# Patient Record
Sex: FEMALE | HISPANIC OR LATINO | Employment: FULL TIME | URBAN - METROPOLITAN AREA
[De-identification: names, ages, dates, MRNs, and addresses within clinical notes are randomized per-mention and may not be internally consistent; named-entity substitution may affect disease eponyms.]

---

## 2018-02-08 ENCOUNTER — HOSPITAL ENCOUNTER (OUTPATIENT)
Dept: LAB | Facility: MEDICAL CENTER | Age: 58
End: 2018-02-08
Attending: EMERGENCY MEDICINE
Payer: COMMERCIAL

## 2018-02-08 ENCOUNTER — HOSPITAL ENCOUNTER (OUTPATIENT)
Facility: MEDICAL CENTER | Age: 58
End: 2018-02-08
Attending: EMERGENCY MEDICINE
Payer: COMMERCIAL

## 2018-02-08 ENCOUNTER — OFFICE VISIT (OUTPATIENT)
Dept: URGENT CARE | Facility: PHYSICIAN GROUP | Age: 58
End: 2018-02-08
Payer: COMMERCIAL

## 2018-02-08 VITALS
HEART RATE: 64 BPM | HEIGHT: 67 IN | DIASTOLIC BLOOD PRESSURE: 68 MMHG | RESPIRATION RATE: 14 BRPM | OXYGEN SATURATION: 96 % | WEIGHT: 129 LBS | SYSTOLIC BLOOD PRESSURE: 122 MMHG | BODY MASS INDEX: 20.25 KG/M2 | TEMPERATURE: 99 F

## 2018-02-08 DIAGNOSIS — J98.8 RTI (RESPIRATORY TRACT INFECTION): ICD-10-CM

## 2018-02-08 DIAGNOSIS — R11.2 NAUSEA VOMITING AND DIARRHEA: ICD-10-CM

## 2018-02-08 DIAGNOSIS — R19.7 NAUSEA VOMITING AND DIARRHEA: ICD-10-CM

## 2018-02-08 DIAGNOSIS — R73.9 HYPERGLYCEMIA: ICD-10-CM

## 2018-02-08 DIAGNOSIS — H10.33 ACUTE CONJUNCTIVITIS OF BOTH EYES, UNSPECIFIED ACUTE CONJUNCTIVITIS TYPE: ICD-10-CM

## 2018-02-08 LAB
ALBUMIN SERPL BCP-MCNC: 3.9 G/DL (ref 3.2–4.9)
ALBUMIN/GLOB SERPL: 1.3 G/DL
ALP SERPL-CCNC: 51 U/L (ref 30–99)
ALT SERPL-CCNC: 53 U/L (ref 2–50)
ANION GAP SERPL CALC-SCNC: 8 MMOL/L (ref 0–11.9)
ANISOCYTOSIS BLD QL SMEAR: ABNORMAL
APPEARANCE UR: NORMAL
AST SERPL-CCNC: 42 U/L (ref 12–45)
BASOPHILS # BLD AUTO: 0.9 % (ref 0–1.8)
BASOPHILS # BLD: 0.08 K/UL (ref 0–0.12)
BILIRUB SERPL-MCNC: 0.7 MG/DL (ref 0.1–1.5)
BILIRUB UR STRIP-MCNC: NORMAL MG/DL
BUN SERPL-MCNC: 12 MG/DL (ref 8–22)
CALCIUM SERPL-MCNC: 9.9 MG/DL (ref 8.5–10.5)
CHLORIDE SERPL-SCNC: 103 MMOL/L (ref 96–112)
CO2 SERPL-SCNC: 29 MMOL/L (ref 20–33)
COLOR UR AUTO: NORMAL
CREAT SERPL-MCNC: 0.59 MG/DL (ref 0.5–1.4)
EOSINOPHIL # BLD AUTO: 0.15 K/UL (ref 0–0.51)
EOSINOPHIL NFR BLD: 1.7 % (ref 0–6.9)
ERYTHROCYTE [DISTWIDTH] IN BLOOD BY AUTOMATED COUNT: 41.8 FL (ref 35.9–50)
FLUAV+FLUBV AG SPEC QL IA: NEGATIVE
GLOBULIN SER CALC-MCNC: 3 G/DL (ref 1.9–3.5)
GLUCOSE BLD-MCNC: 251 MG/DL (ref 70–100)
GLUCOSE SERPL-MCNC: 268 MG/DL (ref 65–99)
GLUCOSE UR STRIP.AUTO-MCNC: 500 MG/DL
HCT VFR BLD AUTO: 39.5 % (ref 37–47)
HGB BLD-MCNC: 13.2 G/DL (ref 12–16)
INT CON NEG: NEGATIVE
INT CON POS: POSITIVE
KETONES UR STRIP.AUTO-MCNC: NORMAL MG/DL
LEUKOCYTE ESTERASE UR QL STRIP.AUTO: NORMAL
LIPASE SERPL-CCNC: 29 U/L (ref 11–82)
LYMPHOCYTES # BLD AUTO: 3.52 K/UL (ref 1–4.8)
LYMPHOCYTES NFR BLD: 40.5 % (ref 22–41)
MACROCYTES BLD QL SMEAR: ABNORMAL
MANUAL DIFF BLD: NORMAL
MCH RBC QN AUTO: 31.6 PG (ref 27–33)
MCHC RBC AUTO-ENTMCNC: 33.4 G/DL (ref 33.6–35)
MCV RBC AUTO: 94.5 FL (ref 81.4–97.8)
MONOCYTES # BLD AUTO: 0.45 K/UL (ref 0–0.85)
MONOCYTES NFR BLD AUTO: 5.2 % (ref 0–13.4)
MORPHOLOGY BLD-IMP: NORMAL
NEUTROPHILS # BLD AUTO: 4.5 K/UL (ref 2–7.15)
NEUTROPHILS NFR BLD: 50 % (ref 44–72)
NEUTS BAND NFR BLD MANUAL: 1.7 % (ref 0–10)
NITRITE UR QL STRIP.AUTO: NORMAL
NRBC # BLD AUTO: 0 K/UL
NRBC BLD-RTO: 0 /100 WBC
PH UR STRIP.AUTO: 8 [PH] (ref 5–8)
PLATELET # BLD AUTO: 230 K/UL (ref 164–446)
PMV BLD AUTO: 9.4 FL (ref 9–12.9)
POTASSIUM SERPL-SCNC: 4.3 MMOL/L (ref 3.6–5.5)
PROT SERPL-MCNC: 6.9 G/DL (ref 6–8.2)
PROT UR QL STRIP: 30 MG/DL
RBC # BLD AUTO: 4.18 M/UL (ref 4.2–5.4)
RBC BLD AUTO: PRESENT
RBC UR QL AUTO: NORMAL
SODIUM SERPL-SCNC: 140 MMOL/L (ref 135–145)
SP GR UR STRIP.AUTO: 1
UROBILINOGEN UR STRIP-MCNC: 8 MG/DL
WBC # BLD AUTO: 8.7 K/UL (ref 4.8–10.8)

## 2018-02-08 PROCEDURE — 83690 ASSAY OF LIPASE: CPT

## 2018-02-08 PROCEDURE — G0480 DRUG TEST DEF 1-7 CLASSES: HCPCS

## 2018-02-08 PROCEDURE — 87086 URINE CULTURE/COLONY COUNT: CPT

## 2018-02-08 PROCEDURE — 80053 COMPREHEN METABOLIC PANEL: CPT

## 2018-02-08 PROCEDURE — 87804 INFLUENZA ASSAY W/OPTIC: CPT | Performed by: EMERGENCY MEDICINE

## 2018-02-08 PROCEDURE — 99204 OFFICE O/P NEW MOD 45 MIN: CPT | Performed by: EMERGENCY MEDICINE

## 2018-02-08 PROCEDURE — 82962 GLUCOSE BLOOD TEST: CPT | Performed by: EMERGENCY MEDICINE

## 2018-02-08 PROCEDURE — 81002 URINALYSIS NONAUTO W/O SCOPE: CPT | Performed by: EMERGENCY MEDICINE

## 2018-02-08 PROCEDURE — 36415 COLL VENOUS BLD VENIPUNCTURE: CPT

## 2018-02-08 PROCEDURE — 85007 BL SMEAR W/DIFF WBC COUNT: CPT

## 2018-02-08 PROCEDURE — 85027 COMPLETE CBC AUTOMATED: CPT

## 2018-02-08 ASSESSMENT — ENCOUNTER SYMPTOMS
VOMITING: 1
COUGH: 1
DIARRHEA: 1
EYE DISCHARGE: 1
SENSORY CHANGE: 0
BLURRED VISION: 0
NUMBER OF EPISODES OF EMESIS TODAY: 1
MYALGIAS: 1
PHOTOPHOBIA: 0
CHILLS: 1
EYE PAIN: 0
ABDOMINAL PAIN: 1
EYE REDNESS: 1
CHANGE IN BOWEL HABIT: 1
FEVER: 1
SHORTNESS OF BREATH: 0
BLOOD IN STOOL: 0
FLANK PAIN: 0
SPUTUM PRODUCTION: 0
HEADACHES: 1
RHINORRHEA: 1
FOCAL WEAKNESS: 0
ROS GI COMMENTS: NO HEMATEMESIS
NAUSEA: 1
LOSS OF CONSCIOUSNESS: 0
ANOREXIA: 0
SORE THROAT: 1
HEARTBURN: 0

## 2018-02-08 NOTE — PROGRESS NOTES
Subjective:      Funmi Kelly is a 57 y.o. female who presents with Conjunctivitis (since saturday, headache, diarrhea and vomiting x 1 day)            Conjunctivitis   This is a new problem. Episode onset: 4 days. The problem occurs daily. The problem has been gradually worsening. Associated symptoms include abdominal pain, a change in bowel habit, chills, congestion, coughing, a fever, headaches, myalgias, nausea, a sore throat, urinary symptoms and vomiting. Pertinent negatives include no anorexia, chest pain or rash. Nothing aggravates the symptoms. Treatments tried: OTC eye drops. The treatment provided mild relief.   URI    This is a recurrent problem. Episode onset: 3 days. The problem has been gradually improving. Maximum temperature: subjective. Associated symptoms include abdominal pain, congestion, coughing, diarrhea, headaches, nausea, rhinorrhea, a sore throat and vomiting. Pertinent negatives include no chest pain, dysuria, ear pain, plugged ear sensation or rash. She has tried increased fluids for the symptoms. The treatment provided no relief.   Emesis   This is a new problem. Episode onset: 4 days ago. The problem has been rapidly improving. Associated symptoms include abdominal pain, a change in bowel habit, chills, congestion, coughing, a fever, headaches, myalgias, nausea, a sore throat, urinary symptoms and vomiting. Pertinent negatives include no anorexia, chest pain or rash. The symptoms are aggravated by eating. She has tried eating and drinking for the symptoms. The treatment provided no relief.   Wears eyeglasses, no contact lenses. Notes allergic conjunctivitis in the past. No high-risk contacts, travel, recent antibiotic use. Notes preceding occasional RLQ pains last several weeks, not now.  Vomiting and diarrhea at onset; notes continued nausea.  Notes blood glucose checks in the past up to 160 non-fasting.    Review of Systems   Constitutional: Positive for chills and fever.  "  HENT: Positive for congestion, rhinorrhea and sore throat. Negative for ear discharge, ear pain and nosebleeds.    Eyes: Positive for discharge and redness. Negative for blurred vision, photophobia and pain.        Eye itching, crusting left > right   Respiratory: Positive for cough. Negative for sputum production and shortness of breath.    Cardiovascular: Negative for chest pain.   Gastrointestinal: Positive for abdominal pain, change in bowel habit, diarrhea, nausea and vomiting. Negative for anorexia, blood in stool and heartburn.        No hematemesis   Genitourinary: Positive for frequency. Negative for dysuria, flank pain, hematuria and urgency.        No vaginal discharge or bleeding.   Musculoskeletal: Positive for myalgias.   Skin: Negative for rash.   Neurological: Positive for headaches. Negative for sensory change, focal weakness and loss of consciousness.        Frontal   Endo/Heme/Allergies: Positive for environmental allergies.   All other systems reviewed and are negative.      PMH:  has no past medical history on file.  MEDS: No current outpatient prescriptions on file.  ALLERGIES:   Allergies   Allergen Reactions   • Advil [Ibuprofen] Swelling     facial   • Asa [Aspirin] Swelling     facial     SURGHX: No past surgical history on file.  SOCHX:    FH: family history is not on file.     Objective:     /68   Pulse 64   Temp 37.2 °C (99 °F)   Resp 14   Ht 1.702 m (5' 7\")   Wt 58.5 kg (129 lb)   SpO2 96%   BMI 20.20 kg/m²      Physical Exam   Constitutional: She is oriented to person, place, and time. She appears well-developed and well-nourished. She is cooperative.  Non-toxic appearance. She does not have a sickly appearance. No distress.   HENT:   Head: Normocephalic.   Right Ear: Tympanic membrane and ear canal normal.   Left Ear: Tympanic membrane and ear canal normal.   Nose: Mucosal edema present. No rhinorrhea.   Mouth/Throat: Uvula is midline and mucous membranes are normal. No " oropharyngeal exudate, posterior oropharyngeal edema or posterior oropharyngeal erythema.   Eyes: EOM and lids are normal. Pupils are equal, round, and reactive to light. Right eye exhibits no discharge and no hordeolum. Left eye exhibits no discharge and no hordeolum. Right conjunctiva is injected. Right conjunctiva has no hemorrhage. Left conjunctiva is injected. Left conjunctiva has no hemorrhage.   Neck: Trachea normal and phonation normal. Neck supple. No JVD present. No thyromegaly present.   Cardiovascular: Normal rate, regular rhythm and normal heart sounds.    No murmur heard.  No significant pedal edema.   Pulmonary/Chest: Effort normal. She has no decreased breath sounds. She has no wheezes. She has no rhonchi. She has no rales.   Abdominal: Soft. She exhibits no distension and no mass. Bowel sounds are decreased. There is generalized tenderness. There is no rigidity, no rebound, no guarding and no CVA tenderness.   Musculoskeletal:   No joint effusions   Lymphadenopathy:     She has no cervical adenopathy.        Right: No inguinal adenopathy present.        Left: No inguinal adenopathy present.   Neurological: She is alert and oriented to person, place, and time. She exhibits normal muscle tone. Coordination and gait normal.   Skin: Skin is warm and dry. No rash noted.   Psychiatric: She has a normal mood and affect. Her speech is normal and behavior is normal. Thought content normal.          S/S/E not consistent with acute narrow angle glaucoma; not acute abdomen.  Advised of need to go to the emergency department if any worsening pain, continued vomiting or diarrhea.   Assessment/Plan:     1. RTI (respiratory tract infection)  negative- POCT Influenza A/B  Recommended supportive care measures, including rest, increasing oral fluid intake.    2. Acute conjunctivitis of both eyes, unspecified acute conjunctivitis type  OTC ketotifen    3. Nausea vomiting and diarrhea  Positive urobili, protein,  glucose- POCT Urinalysis  251-POCT glucose  OTC H2 blocker  CBC unremarkable,   CMP glucose 268, ALT 53,   Lipase normal,   ketones, UA  Advised need for oral hydration    4. Hyperglycemia  PCP F/U arranged for 02/12/18.

## 2018-02-08 NOTE — PATIENT INSTRUCTIONS
Go to the nearest hospital Emergency Department, or call 911, if any worsening condition.  You may use over-the-counter ketotifen (Zaditor) as needed for eye itching relief.  Use over-the-counter famotidine (Pepcid AC), ranitidine (Zantac) or cimetidine (Tagamet) as needed for relief of symptoms; follow the package instructions for dosing.  Use over-the-counter acetaminophen (Tylenol) as needed for pain relief; follow the package instructions for dosing.  You should avoid use of non-steroidal anti- inflammatory medications, such as ibuprofen (Motrin, Advil), naproxen (Aleve), or aspirin-containing medications.  You should contact a primary care provider for follow-up as soon as available.

## 2018-02-09 ENCOUNTER — TELEPHONE (OUTPATIENT)
Dept: URGENT CARE | Facility: PHYSICIAN GROUP | Age: 58
End: 2018-02-09

## 2018-02-09 DIAGNOSIS — R11.2 NAUSEA VOMITING AND DIARRHEA: ICD-10-CM

## 2018-02-09 DIAGNOSIS — R19.7 NAUSEA VOMITING AND DIARRHEA: ICD-10-CM

## 2018-02-09 PROCEDURE — 87086 URINE CULTURE/COLONY COUNT: CPT

## 2018-02-10 LAB — ACETONE SERPL-MCNC: <5 MG/DL

## 2018-02-12 ENCOUNTER — OFFICE VISIT (OUTPATIENT)
Dept: MEDICAL GROUP | Facility: PHYSICIAN GROUP | Age: 58
End: 2018-02-12
Payer: COMMERCIAL

## 2018-02-12 VITALS
DIASTOLIC BLOOD PRESSURE: 78 MMHG | HEART RATE: 68 BPM | WEIGHT: 129.6 LBS | TEMPERATURE: 98.1 F | OXYGEN SATURATION: 96 % | SYSTOLIC BLOOD PRESSURE: 120 MMHG | BODY MASS INDEX: 20.3 KG/M2

## 2018-02-12 DIAGNOSIS — E11.9 TYPE 2 DIABETES MELLITUS WITHOUT COMPLICATION, WITHOUT LONG-TERM CURRENT USE OF INSULIN (HCC): ICD-10-CM

## 2018-02-12 LAB
BACTERIA UR CULT: NORMAL
HBA1C MFR BLD: 9.7 % (ref ?–5.8)
INT CON NEG: NORMAL
INT CON POS: NORMAL
SIGNIFICANT IND 70042: NORMAL
SITE SITE: NORMAL
SOURCE SOURCE: NORMAL

## 2018-02-12 PROCEDURE — 83036 HEMOGLOBIN GLYCOSYLATED A1C: CPT | Performed by: NURSE PRACTITIONER

## 2018-02-12 PROCEDURE — 99205 OFFICE O/P NEW HI 60 MIN: CPT | Performed by: NURSE PRACTITIONER

## 2018-02-12 RX ORDER — LANCETS 30 GAUGE
EACH MISCELLANEOUS
Qty: 100 EACH | Refills: 3 | Status: SHIPPED | OUTPATIENT
Start: 2018-02-12

## 2018-02-12 NOTE — PROGRESS NOTES
RN-CDE Note    Subjective:     Health changes since last visit/interval Hx: New onset diabetes, FSBG pxpvx=201 1.5 hours postprandial    Medications (including changes made today)  Current Outpatient Prescriptions   Medication Sig Dispense Refill   • vitamin D (CHOLECALCIFEROL) 1000 UNIT Tab Take 1,000 Units by mouth every day.       No current facility-administered medications for this visit.        Taking daily ASA: No  Taking above medications as prescribed: Yes  Patient Denies side effects of medication.    Exercise: moderate regular exercise, aerobic < 3 days a week  Diet: meals per day on average: 2, breakfast (eggs, toast, beans, juice) and lunch, cereal or milk with fruit as bedtime snack    Health Maintenance:   Health Maintenance Topics with due status: Overdue       Topic Date Due    IMM DTaP/Tdap/Td Vaccine 12/14/1979    PAP SMEAR 12/14/1981    MAMMOGRAM 12/14/2000    COLONOSCOPY 12/14/2010    IMM INFLUENZA 09/01/2017         DM:   Last A1c:   Lab Results   Component Value Date/Time    HBA1C 9.7 02/12/2018 03:46 PM      A1c goal: < 7    Glucose monitoring frequency: none     Last Retinal Exam: within past year Provider: [facility]  Daily Foot Exam: no  Routine Dental Exams: yes    No results found for: MICROALBCALC, MALBCRT, MALBEXCR, [id number], MICROALBUR, MICRALB, UMICROALBUM, MICROALBTIM     ACR Albumin/Creatinine Ratio goal <30 Due    Diabetic complications: none    HTN:   Blood pressure goal <140/<80 yes.   Currently Rx ACE/ARB: No    Dyslipidemia:    Lab Results   Component Value Date/Time    CHOLSTRLTOT 198 05/26/2016 11:12 AM     (H) 05/26/2016 11:12 AM    HDL 67 05/26/2016 11:12 AM    TRIGLYCERIDE 70 05/26/2016 11:12 AM       Lab Results   Component Value Date/Time    SODIUM 140 02/08/2018 04:02 PM    POTASSIUM 4.3 02/08/2018 04:02 PM    CHLORIDE 103 02/08/2018 04:02 PM    CO2 29 02/08/2018 04:02 PM    GLUCOSE 268 (H) 02/08/2018 04:02 PM    BUN 12 02/08/2018 04:02 PM     CREATININE 0.59 02/08/2018 04:02 PM     Lab Results   Component Value Date/Time    ALKPHOSPHAT 51 02/08/2018 04:02 PM    ASTSGOT 42 02/08/2018 04:02 PM    ALTSGPT 53 (H) 02/08/2018 04:02 PM    TBILIRUBIN 0.7 02/08/2018 04:02 PM        Currently Rx Statin: No      She  reports that she has been smoking.  She has never used smokeless tobacco.    Objective:     Exam:  Monofilament: done  Monofilament testing with a 10 gram force: sensation intact: intact bilaterally  Visual Inspection: Feet without maceration, ulcers, fissures.  Pedal pulses: intact bilaterally      Plan:     Discussed All medications, side effects and compliance (discussed carefully)  Annual eye examinations at Ophthalmology  Diabetic diet discussed in detail, written exchange diet given  Foot care discussed and Podiatry visits  Glucose meter dispensed to patient  Glycohemoglobin and other lab monitoring  Home glucose monitoring demonstrated and taught  Home glucose monitoring emphasized  Labs immediately prior to next visit  Referral to Diabetic Education Department. Type II DM class  Patient educated on Interpretation of lab results, Blood Sugar Goals, Exercise, SMBG Skills, Nutrition  Plate method  Recommended medication changes: start metformin 500 mg twice daily, stop drinking juice, change bedtime cereal to a protein snack

## 2018-02-13 NOTE — PROGRESS NOTES
Chief Complaint   Patient presents with   • Diabetes       HISTORY OF PRESENT ILLNESS: Patient is a 57 y.o. female patient who presents today to discuss the following issues:    Type 2 diabetes mellitus without complication, without long-term current use of insulin (CMS-HCC)  Patient is here today with newly diagnosed diabetes. She has an extensive family history of diabetes, but is having a hard time believing that she is diabetic.  We discussed diet and lifestyle choices at length.  She spent 30 minutes with our diabetes nurse educator.  She will start metformin, she will start checking her blood sugars, she will attend classes regarding diabetes, and she will have her labs checked in 3 months.  She will follow-up her in 3 months.      Patient Active Problem List    Diagnosis Date Noted   • Type 2 diabetes mellitus without complication, without long-term current use of insulin (CMS-HCC) 02/12/2018       Allergies:Advil [ibuprofen] and Asa [aspirin]    Current Outpatient Prescriptions   Medication Sig Dispense Refill   • vitamin D (CHOLECALCIFEROL) 1000 UNIT Tab Take 1,000 Units by mouth every day.     • metFORMIN (GLUCOPHAGE) 500 MG Tab Take 1 Tab by mouth 2 times a day, with meals. 60 Tab 11   • Blood Glucose Monitoring Suppl Supplies Misc Test strips order: [employer] Smartview Sig: use 1x/day and prn ssx high or low sugar #100 RF x 3 100 Strip 3   • Lancets Misc Lancets order: [employer] Fastclix Sig: use 1x/day and prn ssx high or low sugar. #100 RF x 3 100 Each 3     No current facility-administered medications for this visit.        Social History   Substance Use Topics   • Smoking status: Current Every Day Smoker   • Smokeless tobacco: Never Used   • Alcohol use Not on file       No family status information on file.   No family history on file.    Review of Systems:   Constitutional: Negative for fever, chills, weight loss and malaise/fatigue.   HENT: Negative for ear pain, nosebleeds, congestion, sore throat  and neck pain.    Eyes: Negative for blurred vision.   Respiratory: Negative for cough, sputum production, shortness of breath and wheezing.    Cardiovascular: Negative for chest pain, palpitations, orthopnea and leg swelling.   Gastrointestinal: Negative for heartburn, nausea, vomiting and abdominal pain.   Genitourinary: Negative for dysuria, urgency and frequency.   Musculoskeletal: Negative for myalgias, joint pain, and back pain.  Skin: Negative for rash and itching.   Neurological: Negative for dizziness, tingling, tremors, sensory change, focal weakness and headaches.   Endo/Heme/Allergies: Does not bruise/bleed easily.   Psychiatric/Behavioral: Negative for depression, suicidal ideas and memory loss.  The patient is not nervous/anxious and does not have insomnia.    All other systems reviewed and are negative except as in HPI.    Exam:  Blood pressure 120/78, pulse 68, temperature 36.7 °C (98.1 °F), weight 58.8 kg (129 lb 9.6 oz), SpO2 96 %.  General:  Well nourished, well developed female in NAD  Head: Grossly normal.  Neck: Supple without JVD or bruit. Thyroid is not enlarged.  Pulmonary: Clear to ausculation. Normal effort. No rales, ronchi, or wheezing.  Cardiovascular: Regular rate and rhythm without murmur.   Abdomen:  Soft, nontender, nondistended.  Extremities: No clubbing, cyanosis, or edema.  Skin: Intact with no obvious rashes or lesions.  Neuro: Grossly intact.  Psych: Alert and oriented x 3.  Mood and affect appropriate.    Medical decision-making and discussion: Funmi is here today to discuss a new diagnosis of diabetes.  Metformin was sent to her pharmacy, lab work was ordered, she was referred to classes, and she will follow-up here in 3 months.    Time: Greater than 50% of this 60 minute appointment was spent face-to-face providing counseling, coordination of care, etc.  She spent 40 minutes with the diabetes nurse educator, and 20 minutes face-to-face with  me.            Assessment/Plan:  1. Type 2 diabetes mellitus without complication, without long-term current use of insulin (CMS-[facility])  POCT Hemoglobin A1C    Diabetic Monofilament LE Exam    MICROALBUMIN CREAT RATIO URINE    LIPID PROFILE    REFERRAL TO DIABETIC EDUCATION Diabetes Self Management Education / Training (DSME/T) and Medical Nutrition Therapy (MNT): Initial Group DSME/MNT as authorized by payor, Follow-Up DSME/MNT as authorized by payor; DSME/T Content: Monitoring Diabete...    metFORMIN (GLUCOPHAGE) 500 MG Tab    Blood Glucose Monitoring Suppl Supplies Misc    Lancets Misc       Return in about 3 months (around 5/12/2018), or if symptoms worsen or fail to improve.    Please note that this dictation was created using voice recognition software. I have made every reasonable attempt to correct obvious errors, but I expect that there are errors of grammar and possibly content that I did not discover before finalizing the note.

## 2018-02-13 NOTE — ASSESSMENT & PLAN NOTE
Patient is here today with newly diagnosed diabetes. She has an extensive family history of diabetes, but is having a hard time believing that she is diabetic.  We discussed diet and lifestyle choices at length.  She spent 30 minutes with our diabetes nurse educator.  She will start metformin, she will start checking her blood sugars, she will attend classes regarding diabetes, and she will have her labs checked in 3 months.  She will follow-up her in 3 months.

## 2018-02-15 ENCOUNTER — HOSPITAL ENCOUNTER (OUTPATIENT)
Dept: LAB | Facility: MEDICAL CENTER | Age: 58
End: 2018-02-15
Attending: NURSE PRACTITIONER
Payer: COMMERCIAL

## 2018-02-15 DIAGNOSIS — E11.9 TYPE 2 DIABETES MELLITUS WITHOUT COMPLICATION, WITHOUT LONG-TERM CURRENT USE OF INSULIN (HCC): ICD-10-CM

## 2018-02-15 LAB
CHOLEST SERPL-MCNC: 166 MG/DL (ref 100–199)
CREAT UR-MCNC: 84.2 MG/DL
HDLC SERPL-MCNC: 51 MG/DL
LDLC SERPL CALC-MCNC: 100 MG/DL
MICROALBUMIN UR-MCNC: <0.7 MG/DL
MICROALBUMIN/CREAT UR: NORMAL MG/G (ref 0–30)
TRIGL SERPL-MCNC: 75 MG/DL (ref 0–149)

## 2018-02-15 PROCEDURE — 82043 UR ALBUMIN QUANTITATIVE: CPT

## 2018-02-15 PROCEDURE — 36415 COLL VENOUS BLD VENIPUNCTURE: CPT

## 2018-02-15 PROCEDURE — 80061 LIPID PANEL: CPT

## 2018-02-15 PROCEDURE — 82570 ASSAY OF URINE CREATININE: CPT

## 2018-02-22 ENCOUNTER — TELEPHONE (OUTPATIENT)
Dept: MEDICAL GROUP | Facility: PHYSICIAN GROUP | Age: 58
End: 2018-02-22

## 2018-02-23 NOTE — TELEPHONE ENCOUNTER
----- Message from TINA Parks sent at 2/22/2018  4:02 PM PST -----  Please call patient and let her know that her labs look good.  She should plan to keep her appointment to establish care with Dr. Herzog on May 7th.    Thank you!  Marcellus

## 2019-03-07 ENCOUNTER — NON-PROVIDER VISIT (OUTPATIENT)
Dept: URGENT CARE | Facility: PHYSICIAN GROUP | Age: 59
End: 2019-03-07

## 2019-03-07 DIAGNOSIS — Z02.1 PRE-EMPLOYMENT DRUG SCREENING: Primary | ICD-10-CM

## 2019-03-07 LAB
AMP AMPHETAMINE: NORMAL
COC COCAINE: NORMAL
INT CON NEG: NORMAL
INT CON POS: NORMAL
MET METHAMPHETAMINES: NORMAL
OPI OPIATES: NORMAL
PCP PHENCYCLIDINE: NORMAL
POC DRUG COMMENT 753798-OCCUPATIONAL HEALTH: NEGATIVE
THC: NORMAL

## 2019-03-07 PROCEDURE — 80305 DRUG TEST PRSMV DIR OPT OBS: CPT | Performed by: PHYSICIAN ASSISTANT

## 2019-10-07 ENCOUNTER — OFFICE VISIT (OUTPATIENT)
Dept: URGENT CARE | Facility: CLINIC | Age: 59
End: 2019-10-07

## 2019-10-07 VITALS
HEART RATE: 66 BPM | TEMPERATURE: 97.6 F | SYSTOLIC BLOOD PRESSURE: 102 MMHG | HEIGHT: 67 IN | RESPIRATION RATE: 16 BRPM | WEIGHT: 110 LBS | OXYGEN SATURATION: 96 % | BODY MASS INDEX: 17.27 KG/M2 | DIASTOLIC BLOOD PRESSURE: 74 MMHG

## 2019-10-07 DIAGNOSIS — J02.9 SORE THROAT: ICD-10-CM

## 2019-10-07 DIAGNOSIS — R59.0 CERVICAL LYMPHADENOPATHY: ICD-10-CM

## 2019-10-07 PROCEDURE — 99214 OFFICE O/P EST MOD 30 MIN: CPT | Performed by: PHYSICIAN ASSISTANT

## 2019-10-07 RX ORDER — AMOXICILLIN AND CLAVULANATE POTASSIUM 875; 125 MG/1; MG/1
1 TABLET, FILM COATED ORAL 2 TIMES DAILY
Qty: 20 TAB | Refills: 0 | Status: SHIPPED
Start: 2019-10-07 | End: 2020-02-05

## 2019-10-07 NOTE — PROGRESS NOTES
Chief Complaint   Patient presents with   • Sore Throat     x3 days swollen lymph nodes, sore throat, earaches, sinus pressure and congestion, fever chills, bodyaches, cough       HISTORY OF PRESENT ILLNESS: Patient is a 58 y.o. female who presents today for 3 days of worsened sore throat, swollen glands (particularly the left side of neck) as well as some body aches and slight coughing.  Notes the coughing is not severe. She has not had confirmed fevers but has felt hot on and off.  She states she woke up today and had increased suspected gland swelling under her left chin.  She denies oral pain or tooth pain.  No difficulty breathing or moving neck.   Ibuprofen not really helping.          Patient Active Problem List    Diagnosis Date Noted   • Type 2 diabetes mellitus without complication, without long-term current use of insulin ([facility]) 02/12/2018       Allergies:Advil [ibuprofen] and Asa [aspirin]    Current Outpatient Medications Ordered in Epic   Medication Sig Dispense Refill   • amoxicillin-clavulanate (AUGMENTIN) 875-125 MG Tab Take 1 Tab by mouth 2 times a day. 20 Tab 0   • vitamin D (CHOLECALCIFEROL) 1000 UNIT Tab Take 1,000 Units by mouth every day.     • metFORMIN (GLUCOPHAGE) 500 MG Tab Take 1 Tab by mouth 2 times a day, with meals. 60 Tab 11   • Blood Glucose Monitoring Suppl Supplies Misc Test strips order: [employer] Smartview Sig: use 1x/day and prn ssx high or low sugar #100 RF x 3 100 Strip 3   • Lancets Misc Lancets order: [employer] Fastclix Sig: use 1x/day and prn ssx high or low sugar. #100 RF x 3 100 Each 3     No current Epic-ordered facility-administered medications on file.        History reviewed. No pertinent past medical history.    Social History     Tobacco Use   • Smoking status: Current Every Day Smoker   • Smokeless tobacco: Never Used   Substance Use Topics   • Alcohol use: Not Currently   • Drug use: Never       No family status information on file.   History reviewed. No pertinent  "family history.    ROS:  Review of Systems   Constitutional: SEE HPI  HENT: SEE HPI.    Eyes: Negative for blurred vision.   Respiratory: Slight coughing without sputum production, shortness of breath and wheezing.    Cardiovascular: Negative for chest pain, palpitations, orthopnea and leg swelling.   Gastrointestinal: Negative for heartburn, nausea, vomiting and abdominal pain.       Exam:  /74   Pulse 66   Temp 36.4 °C (97.6 °F) (Temporal)   Resp 16   Ht 1.702 m (5' 7\")   Wt 49.9 kg (110 lb)   SpO2 96%   General:  Well nourished, well developed female in NAD  Eyes: PERRLA, EOM within normal limits, no conjunctival injection, no scleral icterus, visual fields and acuity grossly intact.  Ears: Normal shape and symmetry, no tenderness, no discharge. External canals are without any significant edema or erythema. Tympanic membranes are without any inflammation, no effusion. Gross auditory acuity is intact  Nose: Symmetrical, sinuses without tenderness, no discharge.   Mouth: reasonable hygiene, moderate posterior erythema without overt exudates or tonsillar enlargement.  Neck: range of motion within normal limits, no tracheal deviation.  Tender left submandibular and anterior cervical lymphadenopathy.   Pulmonary: Normal respiratory effort, no wheezes, crackles, or rhonchi.  Cardiovascular: regular rate and rhythm without murmurs, rubs, or gallops.  Skin: No visible rashes or lesion. Warm, pink, dry.   Extremities: no clubbing, cyanosis, or edema.  Neuro: A&O x 3. Speech normal/clear.  Normal gait.         Assessment/Plan:  1. Cervical lymphadenopathy  amoxicillin-clavulanate (AUGMENTIN) 875-125 MG Tab   2. Sore throat            -strep negative.    -sore throat with acute lymphadenopathy and without overt other URI symptoms.  Will tx and cover as above with strict RTC precautions/ER precautions for worsening swelling or symptoms despite tx.        Supportive care, differential diagnoses, and indications " for immediate follow-up discussed with patient.   Pathogenesis of diagnosis discussed including typical length and natural progression.   Instructed to return to clinic or nearest emergency department for any change in condition, further concerns, or worsening of symptoms.  Patient states understanding of the plan of care and discharge instructions.        Sary Grant P.A.-C.

## 2020-02-05 ENCOUNTER — OFFICE VISIT (OUTPATIENT)
Dept: URGENT CARE | Facility: CLINIC | Age: 60
End: 2020-02-05

## 2020-02-05 VITALS
DIASTOLIC BLOOD PRESSURE: 78 MMHG | OXYGEN SATURATION: 100 % | SYSTOLIC BLOOD PRESSURE: 110 MMHG | WEIGHT: 118 LBS | HEIGHT: 67 IN | BODY MASS INDEX: 18.52 KG/M2 | HEART RATE: 60 BPM | TEMPERATURE: 98.3 F | RESPIRATION RATE: 18 BRPM

## 2020-02-05 DIAGNOSIS — J01.40 ACUTE NON-RECURRENT PANSINUSITIS: ICD-10-CM

## 2020-02-05 DIAGNOSIS — H65.191 ACUTE MIDDLE EAR EFFUSION, RIGHT: ICD-10-CM

## 2020-02-05 PROCEDURE — 99214 OFFICE O/P EST MOD 30 MIN: CPT | Performed by: PHYSICIAN ASSISTANT

## 2020-02-05 RX ORDER — AMOXICILLIN AND CLAVULANATE POTASSIUM 875; 125 MG/1; MG/1
1 TABLET, FILM COATED ORAL 2 TIMES DAILY
Qty: 14 TAB | Refills: 0 | Status: SHIPPED | OUTPATIENT
Start: 2020-02-05 | End: 2020-02-12

## 2020-02-05 ASSESSMENT — ENCOUNTER SYMPTOMS
VOMITING: 0
CHILLS: 1
WHEEZING: 0
COUGH: 1
SHORTNESS OF BREATH: 0
ABDOMINAL PAIN: 0
NAUSEA: 1
RHINORRHEA: 1
HEADACHES: 1
SPUTUM PRODUCTION: 1
EYE DISCHARGE: 1
DIARRHEA: 0
FEVER: 1
SORE THROAT: 1

## 2020-02-05 NOTE — LETTER
[facility] [place] [id number] [facility] [id number]     February 5, 2020    Patient: Funmi Kelly   YOB: 1960   Date of Visit: 2/5/2020       To Whom It May Concern:    Funmi Kelly was seen and treated in our department on 2/5/2020 for sinus infection.     Sincerely,     Tanvir Jay P.A.-C.

## 2020-02-05 NOTE — PROGRESS NOTES
"Subjective:      Funmi Klely is a 59 y.o. female who presents with Allergic Rhinitis (eye drainage,headache-x5 days) and Earache (right hurts,left itchy)          Otalgia    There is pain in the right ear. This is a new problem. The current episode started in the past 7 days. The problem occurs constantly. The problem has been gradually worsening. There has been no fever. The pain is moderate. Associated symptoms include coughing, ear discharge, headaches, rhinorrhea and a sore throat. Pertinent negatives include no abdominal pain, diarrhea or vomiting. She has tried NSAIDs for the symptoms. The treatment provided no relief.       Review of Systems   Constitutional: Positive for chills and fever.   HENT: Positive for ear discharge, ear pain, rhinorrhea and sore throat.    Eyes: Positive for discharge.   Respiratory: Positive for cough and sputum production. Negative for shortness of breath and wheezing.    Cardiovascular: Negative for chest pain.   Gastrointestinal: Positive for nausea. Negative for abdominal pain, diarrhea and vomiting.   Skin: Negative.    Neurological: Positive for headaches.   Endo/Heme/Allergies: Positive for environmental allergies.          Objective:     /78 (BP Location: Left arm)   Pulse 60   Temp 36.8 °C (98.3 °F) (Temporal)   Resp 18   Ht 1.702 m (5' 7\")   Wt 53.5 kg (118 lb)   SpO2 100%   BMI 18.48 kg/m²      Physical Exam  Vitals signs reviewed.   Constitutional:       Appearance: Normal appearance.   HENT:      Right Ear: A middle ear effusion is present. Tympanic membrane is not injected or erythematous.      Left Ear: Tympanic membrane normal.      Nose: Mucosal edema and rhinorrhea present. Rhinorrhea is clear.      Right Sinus: Maxillary sinus tenderness and frontal sinus tenderness present.      Left Sinus: Maxillary sinus tenderness and frontal sinus tenderness present.      Mouth/Throat:      Mouth: Mucous membranes are moist.      Pharynx: Oropharynx is " clear. Uvula midline. No pharyngeal swelling, oropharyngeal exudate, posterior oropharyngeal erythema or uvula swelling.      Tonsils: No tonsillar exudate.   Eyes:      Conjunctiva/sclera: Conjunctivae normal.      Pupils: Pupils are equal, round, and reactive to light.   Cardiovascular:      Rate and Rhythm: Normal rate and regular rhythm.      Heart sounds: Normal heart sounds.   Pulmonary:      Effort: Pulmonary effort is normal. No respiratory distress.      Breath sounds: Normal breath sounds. No wheezing, rhonchi or rales.   Skin:     General: Skin is warm and dry.   Neurological:      General: No focal deficit present.      Mental Status: She is alert and oriented to person, place, and time.   Psychiatric:         Behavior: Behavior normal.       History reviewed. No pertinent past medical history. History reviewed. No pertinent surgical history.   Social History     Socioeconomic History   • Marital status: Single     Spouse name: Not on file   • Number of children: Not on file   • Years of education: Not on file   • Highest education level: Not on file   Occupational History   • Not on file   Social Needs   • Financial resource strain: Not on file   • Food insecurity:     Worry: Not on file     Inability: Not on file   • Transportation needs:     Medical: Not on file     Non-medical: Not on file   Tobacco Use   • Smoking status: Current Every Day Smoker   • Smokeless tobacco: Never Used   Substance and Sexual Activity   • Alcohol use: Not Currently   • Drug use: Never   • Sexual activity: Not on file   Lifestyle   • Physical activity:     Days per week: Not on file     Minutes per session: Not on file   • Stress: Not on file   Relationships   • Social connections:     Talks on phone: Not on file     Gets together: Not on file     Attends [employer] service: Not on file     Active member of club or organization: Not on file     Attends meetings of clubs or organizations: Not on file     Relationship status:  Not on file   • Intimate partner violence:     Fear of current or ex partner: Not on file     Emotionally abused: Not on file     Physically abused: Not on file     Forced sexual activity: Not on file   Other Topics Concern   • Not on file   Social History Narrative   • Not on file    Advil [ibuprofen] and Asa [aspirin]          Assessment/Plan:   1. Acute non-recurrent pansinusitis    - amoxicillin-clavulanate (AUGMENTIN) 875-125 MG Tab; Take 1 Tab by mouth 2 times a day for 7 days.  Dispense: 14 Tab; Refill: 0    2. Acute middle ear effusion, right    Discussed symptoms consistent with pansinusitis.  Due to examination findings, will treat with Augmentin for 7 days.  Also wrote down other instructions of Flonase, Claritin or Zyrtec in the morning time, Tylenol for any pain or fevers, plenty of fluids, nasal saline washes, Mucinex.    Return if symptoms are not improving the next 2 to 3 days, or sooner if any high fever, severe headache, hearing issues or any other concerns.    Supportive care, differential diagnoses, and indications for immediate follow-up discussed with patient.    Pathogenesis of diagnosis discussed including typical length and natural progression. Patient expresses understanding and agrees to plan.    Please note that this dictation was created using voice recognition software. I have made every reasonable attempt to correct obvious errors, but I expect that there are errors of grammar and possibly content that I did not discover before finalizing the note.

## 2021-03-15 DIAGNOSIS — Z23 NEED FOR VACCINATION: ICD-10-CM

## 2021-11-29 ENCOUNTER — HOSPITAL ENCOUNTER (OUTPATIENT)
Facility: MEDICAL CENTER | Age: 61
End: 2021-11-29
Attending: PHYSICIAN ASSISTANT
Payer: MEDICAID

## 2021-11-29 ENCOUNTER — OFFICE VISIT (OUTPATIENT)
Dept: URGENT CARE | Facility: CLINIC | Age: 61
End: 2021-11-29
Payer: MEDICAID

## 2021-11-29 VITALS
RESPIRATION RATE: 18 BRPM | DIASTOLIC BLOOD PRESSURE: 84 MMHG | TEMPERATURE: 98.4 F | BODY MASS INDEX: 19.62 KG/M2 | OXYGEN SATURATION: 95 % | SYSTOLIC BLOOD PRESSURE: 122 MMHG | WEIGHT: 125 LBS | HEART RATE: 69 BPM | HEIGHT: 67 IN

## 2021-11-29 DIAGNOSIS — J02.9 PHARYNGITIS, UNSPECIFIED ETIOLOGY: ICD-10-CM

## 2021-11-29 DIAGNOSIS — J06.9 VIRAL URI WITH COUGH: ICD-10-CM

## 2021-11-29 LAB — COVID ORDER STATUS COVID19: NORMAL

## 2021-11-29 PROCEDURE — U0003 INFECTIOUS AGENT DETECTION BY NUCLEIC ACID (DNA OR RNA); SEVERE ACUTE RESPIRATORY SYNDROME CORONAVIRUS 2 (SARS-COV-2) (CORONAVIRUS DISEASE [COVID-19]), AMPLIFIED PROBE TECHNIQUE, MAKING USE OF HIGH THROUGHPUT TECHNOLOGIES AS DESCRIBED BY CMS-2020-01-R: HCPCS

## 2021-11-29 PROCEDURE — U0005 INFEC AGEN DETEC AMPLI PROBE: HCPCS

## 2021-11-29 PROCEDURE — 99213 OFFICE O/P EST LOW 20 MIN: CPT | Performed by: PHYSICIAN ASSISTANT

## 2021-11-29 RX ORDER — DEXTROMETHORPHAN HBR. AND GUAIFENESIN 10; 100 MG/5ML; MG/5ML
10 SOLUTION ORAL EVERY 4 HOURS PRN
COMMUNITY

## 2021-11-29 ASSESSMENT — ENCOUNTER SYMPTOMS
COUGH: 1
CHILLS: 0
MYALGIAS: 1
VOMITING: 0
FEVER: 0
NAUSEA: 0
SPUTUM PRODUCTION: 1
SINUS PAIN: 0
SORE THROAT: 1
WHEEZING: 0
SHORTNESS OF BREATH: 0
DIARRHEA: 0
ABDOMINAL PAIN: 0
HEADACHES: 1

## 2021-11-29 NOTE — PROGRESS NOTES
"Subjective:   Funmi Kelly is a 60 y.o. female who presents for Nasal Congestion (nasal/chest congestion x 4 days with cough)      HPI  60 y.o. female presents to urgent care with new problem to provider of nasal congestion, cough, sore throat, and headache onset about 4 days ago.  Patient is vaccinated for COVID-19, however denies specific exposure or sick contacts.  She denies fevers, shortness of breath, chest pain, nausea, vomiting, or diarrhea.  Taking OTC cold and cough medications with some relief. Denies other associated aggravating or alleviating factors.     Review of Systems   Constitutional: Negative for chills and fever.   HENT: Positive for congestion and sore throat. Negative for ear pain and sinus pain.    Respiratory: Positive for cough and sputum production. Negative for shortness of breath and wheezing.    Cardiovascular: Negative for chest pain.   Gastrointestinal: Negative for abdominal pain, diarrhea, nausea and vomiting.   Musculoskeletal: Positive for myalgias.   Neurological: Positive for headaches.       Patient Active Problem List   Diagnosis   • Type 2 diabetes mellitus without complication, without long-term current use of insulin (HCC)     History reviewed. No pertinent surgical history.  Social History     Tobacco Use   • Smoking status: Former Smoker     Types: Cigarettes   • Smokeless tobacco: Never Used   Vaping Use   • Vaping Use: Never used   Substance Use Topics   • Alcohol use: Not Currently   • Drug use: Never      History reviewed. No pertinent family history.   (Allergies, Medications, & Tobacco/Substance Use were reconciled by the Medical Assistant and reviewed by myself. The family history is prepopulated)     Objective:     /84 (BP Location: Left arm, Patient Position: Sitting, BP Cuff Size: Adult long)   Pulse 69   Temp 36.9 °C (98.4 °F) (Temporal)   Resp 18   Ht 1.702 m (5' 7\")   Wt 56.7 kg (125 lb)   SpO2 95%   BMI 19.58 kg/m²     Physical " Exam  Vitals reviewed.   Constitutional:       General: She is not in acute distress.     Appearance: Normal appearance. She is not ill-appearing or diaphoretic.   HENT:      Head: Normocephalic and atraumatic.      Nose: Nose normal.      Mouth/Throat:      Mouth: Mucous membranes are moist.      Pharynx: Posterior oropharyngeal erythema present. No oropharyngeal exudate.   Eyes:      Conjunctiva/sclera: Conjunctivae normal.   Cardiovascular:      Rate and Rhythm: Normal rate and regular rhythm.      Heart sounds: Normal heart sounds. No murmur heard.  No friction rub. No gallop.    Pulmonary:      Effort: Pulmonary effort is normal. No respiratory distress.      Breath sounds: Normal breath sounds. No wheezing, rhonchi or rales.   Musculoskeletal:         General: Normal range of motion.      Cervical back: Normal range of motion and neck supple.   Lymphadenopathy:      Cervical: Cervical adenopathy present.   Skin:     General: Skin is warm and dry.      Findings: No rash.   Neurological:      General: No focal deficit present.      Mental Status: She is alert and oriented to person, place, and time.   Psychiatric:         Mood and Affect: Mood normal.         Behavior: Behavior normal.         Thought Content: Thought content normal.         Judgment: Judgment normal.         Assessment/Plan:     1. Pharyngitis, unspecified etiology  POCT Rapid Strep A   2. Viral URI with cough  COVID/SARS CoV-2 PCR     Point of care strep is negative in clinic today.    Patient instructed to self-isolate/quarantine per CDC guidelines.  I will follow-up pending COVID-19 testing. Discussed with patient may obtain hard copy of results on [employer].   Advised patient symptoms are most likely viral in etiology. Increased fluids and rest. Discussed use of OTC cough and cold medication and Tylenol/Motrin for symptomatic relief.  Return for reevaluation or proceed to ED if symptoms persist or worsen. Supportive care, differential  diagnoses, and indications for immediate follow-up discussed with patient. Patient should to proceed to ED for development of symptoms including but not limited to shortness of breath breath, difficulty breathing, or worsening symptoms not manageable at home.   Vital signs stable, patient in no acute respiratory distress.  COVID-19 discharge instructions and CDC guidelines provided to patient in AVS.      Differential diagnosis, natural history, supportive care, and indications for immediate follow-up discussed.    Advised the patient to follow-up with the primary care physician for recheck, reevaluation, and consideration of further management.  Patient verbalized understanding of treatment plan and has no further questions regarding care.   This patient is evaluated under [facility] isolation protocols in urgent care.  Out of an abundance of caution I am wearing a N95 mask, protective eye gear, and gloves through all interaction with patient.    I personally reviewed prior external notes and test results pertinent to today's visit.     Please note that this dictation was created using voice recognition software. I have made a reasonable attempt to correct obvious errors, but I expect that there are errors of grammar and possibly content that I did not discover before finalizing the note.    This note was electronically signed by Bernadette Stevens PA-C

## 2021-11-29 NOTE — LETTER
November 29, 2021   Your employee was seen in our clinic today.  A concern for COVID-19 has been identified and testing is in progress. We are asking you to excuse absences while following self-isolation protocol per Center for Disease Control (CDC) guidelines.  Your employee will be able to access test results through our electronic delivery system called [employer].     If the results of testing are positive, your employee should follow the CDC guidelines.  These are isolation for a minimum of 10 days and at least 24 hours have passed since your last fever without the use of fever-reducing medications and all other symptoms have improved.  The health department may contact you and provide further directions regarding self-isolation and return to work.     Negative result without close contact*:  If your employee was tested due to their symptoms without close contact to someone with COVID-19 and their test is negative: your employee should not return to work or regular activities until 24 hours after symptoms fully improve. (For example, if patient feels back to normal on Tuesday, should remain isolated through Wednesday).      Negative with close contact*:  If your employee was tested due to close contact to someone, they should self isolate for 10 days after their exposure.    Negative with positive household member  If your employee was due to a positive household member they should still quarantine for a period of 10 days.  The 10 day period begins once the household member is isolated. If unable to quarantine (for example mom from infant), the CDC advises an additional 10-day quarantine period from the COVID-19 household member.   In general, repeat testing is not necessary and not offered through our [facility].     This is the only note that will be provided from [facility] for this visit.  Your employee will require an appointment with a primary care provider if FMLA or disability forms are  required.  If you have any questions please do not hesitate to call me at the phone number listed below.    Sincerely,  DEYSI Nolan.-C.  [phone]

## 2021-11-30 LAB
SARS-COV-2 RNA RESP QL NAA+PROBE: NOTDETECTED
SPECIMEN SOURCE: NORMAL

## 2022-07-31 ENCOUNTER — OFFICE VISIT (OUTPATIENT)
Dept: URGENT CARE | Facility: CLINIC | Age: 62
End: 2022-07-31
Payer: MEDICAID

## 2022-07-31 VITALS
OXYGEN SATURATION: 94 % | RESPIRATION RATE: 16 BRPM | BODY MASS INDEX: 20.59 KG/M2 | HEIGHT: 65 IN | TEMPERATURE: 97.6 F | WEIGHT: 123.6 LBS | DIASTOLIC BLOOD PRESSURE: 74 MMHG | SYSTOLIC BLOOD PRESSURE: 116 MMHG | HEART RATE: 68 BPM

## 2022-07-31 DIAGNOSIS — G44.86 CERVICOGENIC HEADACHE: ICD-10-CM

## 2022-07-31 DIAGNOSIS — M62.838 NECK MUSCLE SPASM: ICD-10-CM

## 2022-07-31 PROCEDURE — 99214 OFFICE O/P EST MOD 30 MIN: CPT | Performed by: PHYSICIAN ASSISTANT

## 2022-07-31 RX ORDER — CYCLOBENZAPRINE HCL 10 MG
10 TABLET ORAL 2 TIMES DAILY PRN
Qty: 14 TABLET | Refills: 0 | Status: SHIPPED | OUTPATIENT
Start: 2022-07-31

## 2022-07-31 ASSESSMENT — ENCOUNTER SYMPTOMS
HEADACHES: 1
FEVER: 0
PALPITATIONS: 0
BLURRED VISION: 0
CHILLS: 0
NECK PAIN: 1
DIZZINESS: 0
DOUBLE VISION: 0

## 2022-07-31 NOTE — PROGRESS NOTES
Subjective:   Funmi Kelly is a 61 y.o. female who presents today with   Chief Complaint   Patient presents with   • Headache     Started on Thursday and has gotten worse. Pt states her neck hurts and not able to turn her head.        Other  This is a new problem. Episode onset: 4 days. The problem occurs constantly. The problem has been unchanged. Associated symptoms include headaches and neck pain. Pertinent negatives include no chest pain, chills or fever. She has tried acetaminophen and heat for the symptoms. The treatment provided mild relief.   No recent injury or trauma.    PMH:  has no past medical history on file.  MEDS:   Current Outpatient Medications:   •  cyclobenzaprine (FLEXERIL) 10 mg Tab, Take 1 Tablet by mouth 2 times a day as needed for Moderate Pain., Disp: 14 Tablet, Rfl: 0  •  guaifenesin dextromethorphan sugar free (DIABETIC TUSSIN DM)  MG/5ML Liquid soln, Take 10 mL by mouth every four hours as needed for Cough., Disp: , Rfl:   •  vitamin D (CHOLECALCIFEROL) 1000 UNIT Tab, Take 1,000 Units by mouth every day., Disp: , Rfl:   •  metFORMIN (GLUCOPHAGE) 500 MG Tab, Take 1 Tab by mouth 2 times a day, with meals., Disp: 60 Tab, Rfl: 11  •  Blood Glucose Monitoring Suppl Supplies Misc, Test strips order: [employer] Smartview Sig: use 1x/day and prn ssx high or low sugar #100 RF x 3, Disp: 100 Strip, Rfl: 3  •  Lancets Misc, Lancets order: [employer] Fastclix Sig: use 1x/day and prn ssx high or low sugar. #100 RF x 3, Disp: 100 Each, Rfl: 3  ALLERGIES:   Allergies   Allergen Reactions   • Advil [Ibuprofen] Swelling     facial   • Asa [Aspirin] Swelling     facial     SURGHX: History reviewed. No pertinent surgical history.  SOCHX:  reports that she has quit smoking. Her smoking use included cigarettes. She has never used smokeless tobacco. She reports previous alcohol use. She reports that she does not use drugs.  FH: Reviewed with patient, not pertinent to this visit.     Review of  "Systems   Constitutional: Negative for chills and fever.   Eyes: Negative for blurred vision and double vision.   Cardiovascular: Negative for chest pain and palpitations.   Musculoskeletal: Positive for neck pain.   Neurological: Positive for headaches. Negative for dizziness.      Objective:   /74 (BP Location: Right arm, Patient Position: Sitting, BP Cuff Size: Adult long)   Pulse 68   Temp 36.4 °C (97.6 °F) (Temporal)   Resp 16   Ht 1.65 m (5' 4.96\")   Wt 56.1 kg (123 lb 9.6 oz)   SpO2 94%   BMI 20.59 kg/m²   Physical Exam  Vitals and nursing note reviewed.   Constitutional:       General: She is not in acute distress.     Appearance: Normal appearance. She is well-developed. She is not ill-appearing or toxic-appearing.   HENT:      Head: Normocephalic and atraumatic.      Right Ear: Hearing normal.      Left Ear: Hearing normal.   Eyes:      Pupils: Pupils are equal, round, and reactive to light.   Neck:        Comments: Slight decreased range of motion to the right but full range of motion to the left.  Tightness/spasm to the right trapezius area.  No midline spinous process tenderness.  Cardiovascular:      Rate and Rhythm: Normal rate and regular rhythm.      Heart sounds: Normal heart sounds.   Pulmonary:      Effort: Pulmonary effort is normal.      Breath sounds: Normal breath sounds.   Musculoskeletal:      Cervical back: No rigidity. Muscular tenderness present.      Comments: Normal movement in all 4 extremities   Skin:     General: Skin is warm and dry.   Neurological:      General: No focal deficit present.      Mental Status: She is alert and oriented to person, place, and time.      Coordination: Coordination normal.   Psychiatric:         Mood and Affect: Mood normal.           Assessment/Plan:   Assessment    1. Neck muscle spasm  - cyclobenzaprine (FLEXERIL) 10 mg Tab; Take 1 Tablet by mouth 2 times a day as needed for Moderate Pain.  Dispense: 14 Tablet; Refill: 0    2. " Cervicogenic headache  Symptoms and presentation are consistent with headache related from the neck muscle spasm.  Toradol contraindicated given patient's allergies.  Recommend continued use of Tylenol and heat/gentle massage to the area.  Patient does understand possible drowsiness side effects of medication and will only take them sparingly as prescribed and not before driving or working and mainly at night.  Differential diagnosis, natural history, supportive care, and indications for immediate follow-up discussed.   Patient given instructions and understanding of medications and treatment.    If not improving in 3-5 days, F/U with PCP or return to  if symptoms worsen.    Patient agreeable to plan.    Please note that this dictation was created using voice recognition software. I have made every reasonable attempt to correct obvious errors, but I expect that there are errors of grammar and possibly content that I did not discover before finalizing the note.    Satya Merino PA-C

## 2022-08-01 ENCOUNTER — TELEPHONE (OUTPATIENT)
Dept: URGENT CARE | Facility: CLINIC | Age: 62
End: 2022-08-01

## 2022-12-06 ENCOUNTER — OFFICE VISIT (OUTPATIENT)
Dept: URGENT CARE | Facility: CLINIC | Age: 62
End: 2022-12-06
Payer: MEDICAID

## 2022-12-06 VITALS
SYSTOLIC BLOOD PRESSURE: 114 MMHG | DIASTOLIC BLOOD PRESSURE: 72 MMHG | OXYGEN SATURATION: 92 % | BODY MASS INDEX: 20.49 KG/M2 | RESPIRATION RATE: 16 BRPM | HEART RATE: 84 BPM | WEIGHT: 123 LBS | TEMPERATURE: 97.5 F

## 2022-12-06 DIAGNOSIS — J22 LRTI (LOWER RESPIRATORY TRACT INFECTION): ICD-10-CM

## 2022-12-06 DIAGNOSIS — R05.1 ACUTE COUGH: ICD-10-CM

## 2022-12-06 PROCEDURE — 99213 OFFICE O/P EST LOW 20 MIN: CPT | Performed by: NURSE PRACTITIONER

## 2022-12-06 RX ORDER — DOXYCYCLINE HYCLATE 100 MG
100 TABLET ORAL 2 TIMES DAILY
Qty: 14 TABLET | Refills: 0 | Status: SHIPPED | OUTPATIENT
Start: 2022-12-06 | End: 2022-12-13

## 2022-12-06 RX ORDER — BENZONATATE 100 MG/1
100 CAPSULE ORAL 3 TIMES DAILY PRN
Qty: 60 CAPSULE | Refills: 0 | Status: SHIPPED | OUTPATIENT
Start: 2022-12-06

## 2022-12-06 RX ORDER — ALBUTEROL SULFATE 90 UG/1
2 AEROSOL, METERED RESPIRATORY (INHALATION) EVERY 6 HOURS PRN
Qty: 8.5 G | Refills: 0 | Status: SHIPPED | OUTPATIENT
Start: 2022-12-06

## 2022-12-06 RX ORDER — METHYLPREDNISOLONE 4 MG/1
TABLET ORAL
Qty: 21 TABLET | Refills: 0 | Status: SHIPPED | OUTPATIENT
Start: 2022-12-06

## 2022-12-06 ASSESSMENT — ENCOUNTER SYMPTOMS
MYALGIAS: 0
SHORTNESS OF BREATH: 0
SORE THROAT: 0
COUGH: 1
VOMITING: 0
EYE REDNESS: 0
DIZZINESS: 0
CHILLS: 1
FEVER: 1
HEADACHES: 1
RHINORRHEA: 1
NAUSEA: 0

## 2022-12-06 NOTE — PROGRESS NOTES
Subjective:   Funmi Kelly is a 61 y.o. female who presents for Cough (On day 5)      URI   This is a new problem. The current episode started in the past 7 days (denies sick contacts at home covid negative). The problem has been gradually worsening. The maximum temperature recorded prior to her arrival was 100.4 - 100.9 F. The fever has been present for 1 to 2 days. Associated symptoms include congestion, coughing, headaches and rhinorrhea. Pertinent negatives include no chest pain, dysuria, ear pain, nausea, plugged ear sensation, rash, sore throat or vomiting. She has tried acetaminophen for the symptoms. The treatment provided no relief.     Review of Systems   Constitutional:  Positive for chills, fever and malaise/fatigue.   HENT:  Positive for congestion and rhinorrhea. Negative for ear pain and sore throat.    Eyes:  Negative for redness.   Respiratory:  Positive for cough. Negative for shortness of breath.    Cardiovascular:  Negative for chest pain.   Gastrointestinal:  Negative for nausea and vomiting.   Genitourinary:  Negative for dysuria.   Musculoskeletal:  Negative for myalgias.   Skin:  Negative for rash.   Neurological:  Positive for headaches. Negative for dizziness.     Medications:    Blood Glucose Test Strips  cyclobenzaprine Tabs  guaifenesin dextromethorphan sugar free Liqd  Lancets  metFORMIN Tabs  vitamin D Tabs    Allergies: Advil [ibuprofen] and Asa [aspirin]    Problem List: Funmi Kelly does not have any pertinent problems on file.    Surgical History:  No past surgical history on file.    Past Social Hx: Funmi Kelly  reports that she has quit smoking. Her smoking use included cigarettes. She has never used smokeless tobacco. She reports that she does not currently use alcohol. She reports that she does not use drugs.     Past Family Hx:  Funmi Kelly family history is not on file.     Problem list, medications, and allergies reviewed by myself today in  E[facility].     Objective:     /72 (BP Location: Right arm, Patient Position: Sitting, BP Cuff Size: Adult long)   Pulse 84   Temp 36.4 °C (97.5 °F) (Temporal)   Resp 16   Wt 55.8 kg (123 lb)   SpO2 92%   BMI 20.49 kg/m²     Physical Exam  Vitals and nursing note reviewed.   Constitutional:       General: She is not in acute distress.     Appearance: She is well-developed.   HENT:      Head: Normocephalic and atraumatic.      Right Ear: Tympanic membrane and external ear normal.      Left Ear: Tympanic membrane and external ear normal.      Nose: Nose normal.      Right Sinus: No maxillary sinus tenderness or frontal sinus tenderness.      Left Sinus: No maxillary sinus tenderness or frontal sinus tenderness.      Mouth/Throat:      Mouth: Mucous membranes are moist.      Pharynx: Uvula midline. No posterior oropharyngeal erythema.      Tonsils: No tonsillar exudate or tonsillar abscesses.   Eyes:      General:         Right eye: No discharge.         Left eye: No discharge.      Conjunctiva/sclera: Conjunctivae normal.   Cardiovascular:      Rate and Rhythm: Normal rate.   Pulmonary:      Effort: Pulmonary effort is normal. No respiratory distress.      Breath sounds: Wheezing present. No rhonchi.   Abdominal:      General: There is no distension.   Musculoskeletal:         General: Normal range of motion.   Skin:     General: Skin is warm and dry.   Neurological:      General: No focal deficit present.      Mental Status: She is alert and oriented to person, place, and time. Mental status is at baseline.      Gait: Gait (gait at baseline) normal.   Psychiatric:         Judgment: Judgment normal.       Assessment/Plan:     Diagnosis and associated orders:   1. LRTI (lower respiratory tract infection)  methylPREDNISolone (MEDROL DOSEPAK) 4 MG Tablet Therapy Pack    albuterol 108 (90 Base) MCG/ACT Aero Soln inhalation aerosol    benzonatate (TESSALON) 100 MG Cap    doxycycline (VIBRAMYCIN) 100 MG Tab      2.  Acute cough  methylPREDNISolone (MEDROL DOSEPAK) 4 MG Tablet Therapy Pack    albuterol 108 (90 Base) MCG/ACT Aero Soln inhalation aerosol    benzonatate (TESSALON) 100 MG Cap    doxycycline (VIBRAMYCIN) 100 MG Tab             Comments/MDM:     I personally reviewed prior external notes and prior test results pertinent to today's visit.   Discussed management options, risks and benefits, and alternatives to treatment plan agreed upon.   Red flags discussed and indications to immediately call 911 or present to the Emergency Department.   Supportive care, differential diagnoses, and indications for immediate follow-up discussed with patient.    Patient expresses understanding and agrees to plan. Patient denies any other questions or concerns.              Please note that this dictation was created using voice recognition software. I have made a reasonable attempt to correct obvious errors, but I expect that there are errors of grammar and possibly content that I did not discover before finalizing the note.    This note was electronically signed by Pérez CUENCA.